# Patient Record
Sex: FEMALE | Race: WHITE
[De-identification: names, ages, dates, MRNs, and addresses within clinical notes are randomized per-mention and may not be internally consistent; named-entity substitution may affect disease eponyms.]

---

## 2019-09-14 ENCOUNTER — HOSPITAL ENCOUNTER (EMERGENCY)
Dept: HOSPITAL 62 - ER | Age: 39
LOS: 1 days | Discharge: HOME | End: 2019-09-15
Payer: OTHER GOVERNMENT
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
## 2019-09-14 DIAGNOSIS — S01.511A: Primary | ICD-10-CM

## 2019-09-14 PROCEDURE — 90471 IMMUNIZATION ADMIN: CPT

## 2019-09-14 PROCEDURE — 12011 RPR F/E/E/N/L/M 2.5 CM/<: CPT

## 2019-09-14 PROCEDURE — 90715 TDAP VACCINE 7 YRS/> IM: CPT

## 2019-09-14 PROCEDURE — 99282 EMERGENCY DEPT VISIT SF MDM: CPT

## 2019-09-14 PROCEDURE — 96374 THER/PROPH/DIAG INJ IV PUSH: CPT

## 2019-09-14 NOTE — ER DOCUMENT REPORT
HPI





- HPI


Time Seen by Provider: 09/14/19 23:17


Pain Level: 3


Notes: 





Patient is an otherwise healthy 38-year-old female presenting to the emergency 

department with lip laceration.  Patient reports she was helping her  cut

up tree limbs when one of the limbs came back and smacked her in the lip causing

a laceration.  She is unsure when her last tetanus shot was.








- REPRODUCTIVE


Reproductive: DENIES: Pregnant:





Past Medical History





- General


Information source: Patient





- Social History


Smoking Status: Never Smoker


Frequency of alcohol use: None


Drug Abuse: None


Family History: Reviewed & Not Pertinent





- Medical History


Medical History: Negative


Past Surgical History: Reports: Hx Tonsillectomy





- Immunizations


Hx Diphtheria, Pertussis, Tetanus Vaccination: Yes





Vertical Provider Document





- CONSTITUTIONAL


Notes: 





PHYSICAL EXAMINATION:





GENERAL: Well-appearing, well-nourished and in no acute distress.





HEAD: Atraumatic, normocephalic.





EYES: Pupils equal round extraocular movements intact,  conjunctiva are normal.





ENT: Nares patent





NECK: Normal range of motion





LUNGS: No respiratory distress





Musculoskeletal: Normal range of motion





NEUROLOGICAL:  Normal speech, normal gait. 





PSYCH: Normal mood, normal affect.





SKIN: 1 cm irregular laceration noted to the inside of patient's lower lip.  

This does not cross the vermilion border.  There is no active bleeding noted.





- INFECTION CONTROL


TRAVEL OUTSIDE OF THE [place] IN LAST 30 DAYS: No





Course





- Re-evaluation


Re-evalutation: 





Laceration was repaired using dissolvable sutures.  See procedure note.  Patient

tolerated well.





The patient's emergency department workup and current diagnosis were explained 

to the patient and or family.  Follow-up instructions were provided.  

Medications if prescribed were discussed. Instructions for when to return to the

emergency department including specific worrisome symptoms were discussed with 

the patient and/or family.








- Vital Signs


Vital signs: 


                                        











Temp Pulse Resp BP Pulse Ox


 


 98.1 F   71   16   131/79 H  96 


 


 09/14/19 21:04  09/14/19 21:04  09/14/19 21:04  09/14/19 21:04  09/14/19 21:04














Procedures





- Laceration/Wound Repair


  ** Lower lip


Wound length (cm): 1


Wound's Depth, Shape: Superficial, Irregular


Laceration pre-procedure: Sterile PPE donned


Anesthetic type: 1% Lidocaine


Wound Repaired With: Sutures


Suture Size/Type: 5:0, Vicryl


Number of Sutures: 2





Discharge





- Discharge


Clinical Impression: 


Lip laceration


Qualifiers:


 Encounter type: initial encounter Qualified Code(s): S01.511A - Laceration 

without foreign body of lip, initial encounter





Condition: Stable


Disposition: HOME, SELF-CARE


Additional Instructions: 


Laceration Care





     Your laceration has been sutured to keep the skin edges aligned during 

healing.  The sutures do not need to be removed as they are dissolvable.  Please

follow the care instructions the doctor has outlined for you and return for 

further care, according to the schedule you've been given.


     Keep the wound and dressing clean.  Unless you were told otherwise, you may

shower daily, blotting the wound dry with a clean, unused towel.  At other 

times, If the dressing gets wet or blood soaked, remove it and blot the wound 

dry, then reapply a new dressing.  Unless you were instructed otherwise, 

dressings should be changed at least daily.


     If any signs of infection occur (swelling, redness, increasing tenderness, 

red streaks, tender lumps in the armpit or groin above the laceration, or 

fever), see the doctor immediately.





*****The sutures should start to dissolve in 3 to 5 days however in some people 

this does take a little bit longer.  Please return earlier if you develop any 

signs of infection such as increased redness, swelling, foul-smelling drainage 

or fever.*****





Prescriptions: 


Hydrocodone Bit/Acetaminophen [Hydrocodon-Acetaminophen 5-325] 1 each PO Q4H #10

tablet


Referrals: 


KEYSHAWN TREVIÑO, ALE[facility] [Primary Care Provider] - Follow up as needed

## 2019-09-15 VITALS — DIASTOLIC BLOOD PRESSURE: 84 MMHG | SYSTOLIC BLOOD PRESSURE: 119 MMHG
